# Patient Record
Sex: FEMALE | Race: OTHER | URBAN - METROPOLITAN AREA
[De-identification: names, ages, dates, MRNs, and addresses within clinical notes are randomized per-mention and may not be internally consistent; named-entity substitution may affect disease eponyms.]

---

## 2022-11-17 ENCOUNTER — APPOINTMENT (RX ONLY)
Dept: URBAN - METROPOLITAN AREA CLINIC 102 | Facility: CLINIC | Age: 47
Setting detail: DERMATOLOGY
End: 2022-11-17

## 2022-11-17 DIAGNOSIS — Z41.9 ENCOUNTER FOR PROCEDURE FOR PURPOSES OTHER THAN REMEDYING HEALTH STATE, UNSPECIFIED: ICD-10-CM

## 2022-11-17 PROCEDURE — ? BOTOX

## 2022-11-17 NOTE — PROCEDURE: BOTOX
Show Periorbital Units: Yes
Lateral Platysmal Bands Units: 0
Show Ucl Units: No
Consent: Verbal consent obtained. Risks include but not limited to lid/brow ptosis, bruising, swelling, diplopia, temporary effect, incomplete chemical denervation.
Additional Area 1 Location: upper lip
Post-Care Instructions: Patient instructed to not lie down for 6 hours and limit physical activity for 24 hours.
Forehead Units: 8
Additional Area 1 Units: 4
Glabellar Complex Units: 10
Detail Level: Detailed
Show Inventory Tab: Show
Periorbital Skin Units: [address]